# Patient Record
Sex: FEMALE | Race: WHITE
[De-identification: names, ages, dates, MRNs, and addresses within clinical notes are randomized per-mention and may not be internally consistent; named-entity substitution may affect disease eponyms.]

---

## 2017-07-17 ENCOUNTER — HOSPITAL ENCOUNTER (OUTPATIENT)
Dept: HOSPITAL 45 - C.LABBFT | Age: 37
Discharge: HOME | End: 2017-07-17
Attending: PHYSICIAN ASSISTANT
Payer: COMMERCIAL

## 2017-07-17 DIAGNOSIS — R63.5: Primary | ICD-10-CM

## 2017-07-17 LAB
ALBUMIN/GLOB SERPL: 0.9 {RATIO} (ref 0.9–2)
ALP SERPL-CCNC: 68 U/L (ref 45–117)
ALT SERPL-CCNC: 14 U/L (ref 12–78)
ANION GAP SERPL CALC-SCNC: 6 MMOL/L (ref 3–11)
AST SERPL-CCNC: 10 U/L (ref 15–37)
BASOPHILS # BLD: 0.03 K/UL (ref 0–0.2)
BASOPHILS NFR BLD: 0.6 %
BUN SERPL-MCNC: 13 MG/DL (ref 7–18)
BUN/CREAT SERPL: 19 (ref 10–20)
CALCIUM SERPL-MCNC: 8.7 MG/DL (ref 8.5–10.1)
CHLORIDE SERPL-SCNC: 112 MMOL/L (ref 98–107)
CO2 SERPL-SCNC: 25 MMOL/L (ref 21–32)
COMPLETE: YES
CREAT SERPL-MCNC: 0.69 MG/DL (ref 0.6–1.2)
EOSINOPHIL NFR BLD AUTO: 302 K/UL (ref 130–400)
GLOBULIN SER-MCNC: 3.7 GM/DL (ref 2.5–4)
GLUCOSE SERPL-MCNC: 94 MG/DL (ref 70–99)
HCT VFR BLD CALC: 38.6 % (ref 37–47)
IG%: 0.2 %
IMM GRANULOCYTES NFR BLD AUTO: 43.8 %
LYMPHOCYTES # BLD: 2.35 K/UL (ref 1.2–3.4)
MCH RBC QN AUTO: 27.8 PG (ref 25–34)
MCHC RBC AUTO-ENTMCNC: 32.9 G/DL (ref 32–36)
MCV RBC AUTO: 84.5 FL (ref 80–100)
MONOCYTES NFR BLD: 4.5 %
NEUTROPHILS # BLD AUTO: 0.9 %
NEUTROPHILS NFR BLD AUTO: 50 %
PMV BLD AUTO: 10.8 FL (ref 7.4–10.4)
POTASSIUM SERPL-SCNC: 4.4 MMOL/L (ref 3.5–5.1)
RBC # BLD AUTO: 4.57 M/UL (ref 4.2–5.4)
SODIUM SERPL-SCNC: 143 MMOL/L (ref 136–145)
TSH SERPL-ACNC: 1.38 UIU/ML (ref 0.3–4.5)
WBC # BLD AUTO: 5.36 K/UL (ref 4.8–10.8)

## 2017-12-12 ENCOUNTER — HOSPITAL ENCOUNTER (OUTPATIENT)
Dept: HOSPITAL 45 - C.PAPS | Age: 37
Discharge: HOME | End: 2017-12-12
Attending: OBSTETRICS & GYNECOLOGY
Payer: COMMERCIAL

## 2017-12-12 DIAGNOSIS — Z01.419: Primary | ICD-10-CM

## 2018-01-10 ENCOUNTER — HOSPITAL ENCOUNTER (OUTPATIENT)
Dept: HOSPITAL 45 - C.RAD | Age: 38
Discharge: HOME | End: 2018-01-10
Attending: PHYSICIAN ASSISTANT
Payer: COMMERCIAL

## 2018-01-10 DIAGNOSIS — M26.609: Primary | ICD-10-CM

## 2018-01-10 NOTE — DIAGNOSTIC IMAGING REPORT
TEMPOROMANDIBULAR JOINT



CLINICAL HISTORY: TEMPOROMANDIBULAR DISORDER,M26.609    



COMPARISON STUDY:  No previous studies for comparison.



FINDINGS: The temporomandibular joints appear normal on conventional

radiographic evaluation. There is no dislocation. Both mandibular condyles move

symmetrically.



IMPRESSION:  Normal conventional radiographic evaluation of the TMJs 









Electronically signed by:  Eduard Negrete M.D.

1/10/2018 4:42 PM



Dictated Date/Time:  1/10/2018 4:38 PM

## 2018-02-02 ENCOUNTER — HOSPITAL ENCOUNTER (OUTPATIENT)
Dept: HOSPITAL 45 - C.RAD1850 | Age: 38
Discharge: HOME | End: 2018-02-02
Attending: PHYSICIAN ASSISTANT
Payer: COMMERCIAL

## 2018-02-02 DIAGNOSIS — K59.00: Primary | ICD-10-CM

## 2018-02-02 DIAGNOSIS — R14.0: ICD-10-CM

## 2018-02-02 DIAGNOSIS — R10.9: ICD-10-CM

## 2018-02-02 NOTE — DIAGNOSTIC IMAGING REPORT
CHEST AND ABDOMEN 2 VIEWS



HISTORY: K59.00 RebfncodaurrB14.9 generalized abdominal pain of multiple

luvnkT89.0 A   



COMPARISON:  Chest 10/26/2015.



FINDINGS: The lungs are clear. The cardiomediastinal silhouette is within normal

limits.

There is no pneumoperitoneum or pneumatosis. The bowel gas pattern is

unremarkable. No evidence for bowel obstruction. No renal or ureteral calculi.

Small to moderate amount of stool within the colon and rectum. S1 demonstrates

partial lumbarization.



IMPRESSION:  

No acute cardiopulmonary process. No evidence for bowel obstruction. 









Electronically signed by:  Durga Reed M.D.

2/2/2018 9:46 AM



Dictated Date/Time:  2/2/2018 9:43 AM

## 2018-02-05 ENCOUNTER — HOSPITAL ENCOUNTER (OUTPATIENT)
Dept: HOSPITAL 45 - C.CTS | Age: 38
Discharge: HOME | End: 2018-02-05
Attending: PHYSICIAN ASSISTANT
Payer: COMMERCIAL

## 2018-02-05 DIAGNOSIS — R10.32: Primary | ICD-10-CM

## 2018-02-05 DIAGNOSIS — R19.4: ICD-10-CM

## 2018-02-05 NOTE — DIAGNOSTIC IMAGING REPORT
CT ABD/PELVIS IV AND ORAL CONT



CLINICAL HISTORY: R10.32 Abdominal pain, LLQ (left lower quadrant)R19.4 Change

in bowel habits



COMPARISON STUDY:  1/13/2014



TECHNIQUE: Following the IV administration of 120 mL of Optiray-320, CT scan of

the abdomen and pelvis was performed from the lung bases to the proximal femurs.

Images are reviewed in the axial, sagittal, and coronal planes. IV contrast was

administered without complication.  A dose lowering technique was utilized

adhering to the principles of ALARA.





CT DOSE: 1071.20 mGycm



FINDINGS:



Lower chest: There are minor dependent atelectatic changes present.



Liver: The contrast-enhanced liver is normal in size, contour, and attenuation.

There is no intrahepatic biliary ductal dilatation. The hepatic veins and portal

veins are patent.



Gallbladder: Unremarkable.



Spleen: Normal in size and attenuation.



Pancreas: Unremarkable.



Adrenal glands: Unremarkable.



Kidneys: There is symmetric renal cortical enhancement. The kidneys are normal

in size without hydronephrosis.



Bowel: There are no transition zones indicate bowel obstruction. There is no

evidence of acute appendicitis. There is no evidence of acute diverticulitis.



Peritoneum: There is no intraperitoneal free air or abdominal ascites.



Vasculature: The abdominal aorta is normal in course and caliber.



Adenopathy: None.



Pelvic viscera: There is a 21 mm left ovarian follicle.



Skeletal structures: No destructive osseous lesions are seen.



IMPRESSION:  

1. No acute intra-abdominal or pelvic findings

2. No evidence of bowel obstruction. No evidence of free air

3. No evidence of acute diverticulitis. No evidence of acute appendicitis.







Electronically signed by:  Eduard Negrete M.D.

2/5/2018 4:01 PM



Dictated Date/Time:  2/5/2018 3:56 PM

## 2018-08-09 ENCOUNTER — HOSPITAL ENCOUNTER (OUTPATIENT)
Dept: HOSPITAL 45 - C.LABBFT | Age: 38
Discharge: HOME | End: 2018-08-09
Attending: PHYSICIAN ASSISTANT
Payer: COMMERCIAL
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
## 2018-08-09 DIAGNOSIS — T14.8XXA: Primary | ICD-10-CM
